# Patient Record
Sex: FEMALE | Race: WHITE | NOT HISPANIC OR LATINO
[De-identification: names, ages, dates, MRNs, and addresses within clinical notes are randomized per-mention and may not be internally consistent; named-entity substitution may affect disease eponyms.]

---

## 2017-01-13 ENCOUNTER — MEDICATION RENEWAL (OUTPATIENT)
Age: 67
End: 2017-01-13

## 2017-01-18 ENCOUNTER — RX RENEWAL (OUTPATIENT)
Age: 67
End: 2017-01-18

## 2017-02-27 ENCOUNTER — APPOINTMENT (OUTPATIENT)
Dept: INTERNAL MEDICINE | Facility: CLINIC | Age: 67
End: 2017-02-27

## 2017-02-27 ENCOUNTER — RX RENEWAL (OUTPATIENT)
Age: 67
End: 2017-02-27

## 2017-02-27 VITALS — DIASTOLIC BLOOD PRESSURE: 70 MMHG | HEART RATE: 72 BPM | RESPIRATION RATE: 14 BRPM | SYSTOLIC BLOOD PRESSURE: 138 MMHG

## 2017-03-02 LAB
ALBUMIN SERPL ELPH-MCNC: 4.6 G/DL
ALP BLD-CCNC: 56 U/L
ALT SERPL-CCNC: 19 U/L
ANION GAP SERPL CALC-SCNC: 15 MMOL/L
AST SERPL-CCNC: 23 U/L
BILIRUB SERPL-MCNC: 0.6 MG/DL
BUN SERPL-MCNC: 15 MG/DL
CALCIUM SERPL-MCNC: 9.7 MG/DL
CHLORIDE SERPL-SCNC: 102 MMOL/L
CHOLEST SERPL-MCNC: 271 MG/DL
CHOLEST/HDLC SERPL: 4 RATIO
CO2 SERPL-SCNC: 24 MMOL/L
CREAT SERPL-MCNC: 0.76 MG/DL
GLUCOSE SERPL-MCNC: 79 MG/DL
HDLC SERPL-MCNC: 67 MG/DL
LDLC SERPL CALC-MCNC: 139 MG/DL
POTASSIUM SERPL-SCNC: 4.2 MMOL/L
PROT SERPL-MCNC: 7.3 G/DL
SODIUM SERPL-SCNC: 141 MMOL/L
TRIGL SERPL-MCNC: 327 MG/DL

## 2017-03-13 ENCOUNTER — RX RENEWAL (OUTPATIENT)
Age: 67
End: 2017-03-13

## 2017-05-31 ENCOUNTER — APPOINTMENT (OUTPATIENT)
Dept: INTERNAL MEDICINE | Facility: CLINIC | Age: 67
End: 2017-05-31

## 2017-05-31 ENCOUNTER — NON-APPOINTMENT (OUTPATIENT)
Age: 67
End: 2017-05-31

## 2017-05-31 VITALS — WEIGHT: 136 LBS | HEIGHT: 63 IN | BODY MASS INDEX: 24.1 KG/M2

## 2017-05-31 VITALS — RESPIRATION RATE: 14 BRPM | SYSTOLIC BLOOD PRESSURE: 132 MMHG | HEART RATE: 72 BPM | DIASTOLIC BLOOD PRESSURE: 76 MMHG

## 2017-05-31 DIAGNOSIS — E55.9 VITAMIN D DEFICIENCY, UNSPECIFIED: ICD-10-CM

## 2017-05-31 RX ORDER — MELOXICAM 15 MG/1
15 TABLET ORAL
Qty: 30 | Refills: 0 | Status: COMPLETED | COMMUNITY
Start: 2017-04-19

## 2017-05-31 RX ORDER — UBIDECARENONE 100 MG
100 CAPSULE ORAL
Refills: 0 | Status: ACTIVE | COMMUNITY
Start: 2017-05-31

## 2017-05-31 RX ORDER — CEPHALEXIN 500 MG/1
500 CAPSULE ORAL
Qty: 20 | Refills: 0 | Status: COMPLETED | COMMUNITY
Start: 2017-03-06

## 2017-05-31 RX ORDER — METOCLOPRAMIDE 10 MG/1
10 TABLET ORAL
Qty: 20 | Refills: 0 | Status: COMPLETED | COMMUNITY
Start: 2017-03-23

## 2017-05-31 RX ORDER — TIZANIDINE HYDROCHLORIDE 2 MG/1
2 CAPSULE ORAL
Qty: 28 | Refills: 0 | Status: COMPLETED | COMMUNITY
Start: 2017-03-13

## 2017-05-31 RX ORDER — MELOXICAM 7.5 MG/1
7.5 TABLET ORAL
Qty: 60 | Refills: 0 | Status: COMPLETED | COMMUNITY
Start: 2017-03-22

## 2017-05-31 RX ORDER — TAPENTADOL HYDROCHLORIDE 50 MG/1
50 TABLET, FILM COATED ORAL
Qty: 120 | Refills: 0 | Status: COMPLETED | COMMUNITY
Start: 2017-03-10

## 2017-05-31 RX ORDER — PANTOPRAZOLE 40 MG/1
40 TABLET, DELAYED RELEASE ORAL
Qty: 42 | Refills: 0 | Status: COMPLETED | COMMUNITY
Start: 2017-03-10

## 2017-06-01 ENCOUNTER — RX RENEWAL (OUTPATIENT)
Age: 67
End: 2017-06-01

## 2017-06-23 LAB
25(OH)D3 SERPL-MCNC: 31.1 NG/ML
ALBUMIN SERPL ELPH-MCNC: 4.7 G/DL
ALP BLD-CCNC: 50 U/L
ALT SERPL-CCNC: 12 U/L
ANION GAP SERPL CALC-SCNC: 16 MMOL/L
APPEARANCE: CLEAR
AST SERPL-CCNC: 19 U/L
BASOPHILS # BLD AUTO: 0.03 K/UL
BASOPHILS NFR BLD AUTO: 0.4 %
BILIRUB SERPL-MCNC: 0.3 MG/DL
BILIRUBIN URINE: NEGATIVE
BLOOD URINE: NEGATIVE
BUN SERPL-MCNC: 29 MG/DL
CALCIUM SERPL-MCNC: 9.6 MG/DL
CHLORIDE SERPL-SCNC: 105 MMOL/L
CHOLEST SERPL-MCNC: 208 MG/DL
CHOLEST/HDLC SERPL: 3.5 RATIO
CO2 SERPL-SCNC: 21 MMOL/L
COLOR: YELLOW
CREAT SERPL-MCNC: 0.96 MG/DL
EOSINOPHIL # BLD AUTO: 0.09 K/UL
EOSINOPHIL NFR BLD AUTO: 1.3 %
FOLATE SERPL-MCNC: >20 NG/ML
GLUCOSE QUALITATIVE U: NORMAL MG/DL
GLUCOSE SERPL-MCNC: 86 MG/DL
HBA1C MFR BLD HPLC: 5.6 %
HCT VFR BLD CALC: 39.1 %
HDLC SERPL-MCNC: 60 MG/DL
HGB BLD-MCNC: 12.5 G/DL
IMM GRANULOCYTES NFR BLD AUTO: 0.3 %
KETONES URINE: NEGATIVE
LDLC SERPL CALC-MCNC: 104 MG/DL
LEUKOCYTE ESTERASE URINE: NEGATIVE
LYMPHOCYTES # BLD AUTO: 1.69 K/UL
LYMPHOCYTES NFR BLD AUTO: 25.1 %
MAGNESIUM SERPL-MCNC: 2.3 MG/DL
MAN DIFF?: NORMAL
MCHC RBC-ENTMCNC: 30.7 PG
MCHC RBC-ENTMCNC: 32 GM/DL
MCV RBC AUTO: 96.1 FL
MONOCYTES # BLD AUTO: 0.4 K/UL
MONOCYTES NFR BLD AUTO: 6 %
NEUTROPHILS # BLD AUTO: 4.49 K/UL
NEUTROPHILS NFR BLD AUTO: 66.9 %
NITRITE URINE: NEGATIVE
PH URINE: 7
PLATELET # BLD AUTO: 262 K/UL
POTASSIUM SERPL-SCNC: 4.5 MMOL/L
PROT SERPL-MCNC: 7.4 G/DL
PROTEIN URINE: NEGATIVE MG/DL
RBC # BLD: 4.07 M/UL
RBC # FLD: 13.9 %
SODIUM SERPL-SCNC: 142 MMOL/L
SPECIFIC GRAVITY URINE: 1.01
T4 FREE SERPL-MCNC: 1.3 NG/DL
T4 SERPL-MCNC: 6.3 UG/DL
TRIGL SERPL-MCNC: 219 MG/DL
TSH SERPL-ACNC: 1.82 UIU/ML
UROBILINOGEN URINE: NORMAL MG/DL
VIT B12 SERPL-MCNC: 810 PG/ML
WBC # FLD AUTO: 6.72 K/UL

## 2017-07-11 ENCOUNTER — APPOINTMENT (OUTPATIENT)
Dept: INFECTIOUS DISEASE | Facility: CLINIC | Age: 67
End: 2017-07-11

## 2017-07-11 DIAGNOSIS — Z71.89 OTHER SPECIFIED COUNSELING: ICD-10-CM

## 2017-07-14 ENCOUNTER — OTHER (OUTPATIENT)
Age: 67
End: 2017-07-14

## 2017-07-14 PROBLEM — Z71.89 TRAVEL ADVICE ENCOUNTER: Status: ACTIVE | Noted: 2017-07-14

## 2017-07-17 ENCOUNTER — APPOINTMENT (OUTPATIENT)
Dept: INTERNAL MEDICINE | Facility: CLINIC | Age: 67
End: 2017-07-17

## 2017-07-17 DIAGNOSIS — Z23 ENCOUNTER FOR IMMUNIZATION: ICD-10-CM

## 2017-07-18 ENCOUNTER — MED ADMIN CHARGE (OUTPATIENT)
Age: 67
End: 2017-07-18

## 2017-08-01 ENCOUNTER — APPOINTMENT (OUTPATIENT)
Dept: INTERNAL MEDICINE | Facility: CLINIC | Age: 67
End: 2017-08-01
Payer: MEDICARE

## 2017-08-01 PROCEDURE — 90471 IMMUNIZATION ADMIN: CPT

## 2017-08-01 PROCEDURE — 90715 TDAP VACCINE 7 YRS/> IM: CPT | Mod: GY

## 2017-08-30 ENCOUNTER — RX RENEWAL (OUTPATIENT)
Age: 67
End: 2017-08-30

## 2017-09-18 ENCOUNTER — APPOINTMENT (OUTPATIENT)
Dept: ORTHOPEDIC SURGERY | Facility: CLINIC | Age: 67
End: 2017-09-18
Payer: MEDICARE

## 2017-09-18 VITALS
HEIGHT: 63 IN | HEART RATE: 80 BPM | SYSTOLIC BLOOD PRESSURE: 160 MMHG | DIASTOLIC BLOOD PRESSURE: 78 MMHG | WEIGHT: 137 LBS | BODY MASS INDEX: 24.27 KG/M2

## 2017-09-18 DIAGNOSIS — Z78.9 OTHER SPECIFIED HEALTH STATUS: ICD-10-CM

## 2017-09-18 DIAGNOSIS — M51.26 OTHER INTERVERTEBRAL DISC DISPLACEMENT, LUMBAR REGION: ICD-10-CM

## 2017-09-18 DIAGNOSIS — Z80.9 FAMILY HISTORY OF MALIGNANT NEOPLASM, UNSPECIFIED: ICD-10-CM

## 2017-09-18 DIAGNOSIS — M43.16 SPONDYLOLISTHESIS, LUMBAR REGION: ICD-10-CM

## 2017-09-18 PROCEDURE — 73502 X-RAY EXAM HIP UNI 2-3 VIEWS: CPT | Mod: LT

## 2017-09-18 PROCEDURE — 99204 OFFICE O/P NEW MOD 45 MIN: CPT

## 2017-09-18 PROCEDURE — 72110 X-RAY EXAM L-2 SPINE 4/>VWS: CPT

## 2017-09-18 RX ORDER — FLUTICASONE PROPIONATE 50 UG/1
50 SPRAY, METERED NASAL
Refills: 0 | Status: ACTIVE | COMMUNITY

## 2017-09-18 RX ORDER — HYDROCODONE BITARTRATE AND ACETAMINOPHEN 10; 325 MG/1; MG/1
10-325 TABLET ORAL
Qty: 28 | Refills: 0 | Status: DISCONTINUED | COMMUNITY
Start: 2017-08-23

## 2017-09-18 RX ORDER — ACETAMINOPHEN 325 MG/1
TABLET, FILM COATED ORAL
Refills: 0 | Status: ACTIVE | COMMUNITY

## 2017-09-18 RX ORDER — MELOXICAM 15 MG/1
TABLET ORAL
Refills: 0 | Status: ACTIVE | COMMUNITY

## 2017-09-19 PROBLEM — M43.16 SPONDYLOLISTHESIS OF LUMBAR REGION: Status: ACTIVE | Noted: 2017-05-31

## 2017-09-25 ENCOUNTER — MEDICATION RENEWAL (OUTPATIENT)
Age: 67
End: 2017-09-25

## 2017-09-27 ENCOUNTER — MEDICATION RENEWAL (OUTPATIENT)
Age: 67
End: 2017-09-27

## 2017-10-05 ENCOUNTER — APPOINTMENT (OUTPATIENT)
Dept: INTERNAL MEDICINE | Facility: CLINIC | Age: 67
End: 2017-10-05
Payer: MEDICARE

## 2017-10-05 VITALS — SYSTOLIC BLOOD PRESSURE: 138 MMHG | DIASTOLIC BLOOD PRESSURE: 66 MMHG | HEART RATE: 72 BPM | RESPIRATION RATE: 14 BRPM

## 2017-10-05 DIAGNOSIS — R58 HEMORRHAGE, NOT ELSEWHERE CLASSIFIED: ICD-10-CM

## 2017-10-05 DIAGNOSIS — Z23 ENCOUNTER FOR IMMUNIZATION: ICD-10-CM

## 2017-10-05 DIAGNOSIS — I10 ESSENTIAL (PRIMARY) HYPERTENSION: ICD-10-CM

## 2017-10-05 PROCEDURE — 36415 COLL VENOUS BLD VENIPUNCTURE: CPT

## 2017-10-05 PROCEDURE — 99214 OFFICE O/P EST MOD 30 MIN: CPT | Mod: 25

## 2017-10-05 PROCEDURE — 90662 IIV NO PRSV INCREASED AG IM: CPT

## 2017-10-05 PROCEDURE — G0008: CPT

## 2017-10-05 RX ORDER — ROSUVASTATIN CALCIUM 5 MG/1
TABLET, FILM COATED ORAL
Refills: 0 | Status: DISCONTINUED | COMMUNITY
End: 2017-10-05

## 2017-10-05 RX ORDER — TRIAMTERENE AND HYDROCHLOROTHIAZIDE 37.5; 25 MG/1; MG/1
CAPSULE ORAL
Refills: 0 | Status: DISCONTINUED | COMMUNITY
End: 2017-10-05

## 2017-10-05 RX ORDER — LOSARTAN POTASSIUM 100 MG/1
TABLET, FILM COATED ORAL
Refills: 0 | Status: DISCONTINUED | COMMUNITY
End: 2017-10-05

## 2017-10-05 RX ORDER — AZITHROMYCIN 250 MG/1
250 TABLET, FILM COATED ORAL
Qty: 4 | Refills: 0 | Status: DISCONTINUED | COMMUNITY
Start: 2017-07-14 | End: 2017-10-05

## 2017-10-05 RX ORDER — HYDROCODONE BITARTRATE AND ACETAMINOPHEN 5; 325 MG/1; MG/1
5-325 TABLET ORAL
Qty: 84 | Refills: 0 | Status: DISCONTINUED | COMMUNITY
Start: 2017-04-05 | End: 2017-10-05

## 2017-10-05 RX ORDER — ATOVAQUONE AND PROGUANIL HYDROCHLORIDE 250; 100 MG/1; MG/1
250-100 TABLET, FILM COATED ORAL
Qty: 26 | Refills: 0 | Status: DISCONTINUED | COMMUNITY
Start: 2017-07-14 | End: 2017-10-05

## 2017-10-05 RX ORDER — LOSARTAN POTASSIUM AND HYDROCHLOROTHIAZIDE 12.5; 5 MG/1; MG/1
50-12.5 TABLET ORAL
Qty: 90 | Refills: 0 | Status: DISCONTINUED | COMMUNITY
Start: 2017-06-01 | End: 2017-10-05

## 2017-10-06 LAB
ALBUMIN SERPL ELPH-MCNC: 4.4 G/DL
ALP BLD-CCNC: 42 U/L
ALT SERPL-CCNC: 16 U/L
ANION GAP SERPL CALC-SCNC: 13 MMOL/L
AST SERPL-CCNC: 21 U/L
BASOPHILS # BLD AUTO: 0.03 K/UL
BASOPHILS NFR BLD AUTO: 0.4 %
BILIRUB SERPL-MCNC: 0.4 MG/DL
BUN SERPL-MCNC: 25 MG/DL
CALCIUM SERPL-MCNC: 9.2 MG/DL
CHLORIDE SERPL-SCNC: 104 MMOL/L
CHOLEST SERPL-MCNC: 224 MG/DL
CHOLEST/HDLC SERPL: 3 RATIO
CO2 SERPL-SCNC: 27 MMOL/L
CREAT SERPL-MCNC: 1.12 MG/DL
EOSINOPHIL # BLD AUTO: 0.07 K/UL
EOSINOPHIL NFR BLD AUTO: 1 %
GLUCOSE SERPL-MCNC: 111 MG/DL
HCT VFR BLD CALC: 36.5 %
HDLC SERPL-MCNC: 74 MG/DL
HGB BLD-MCNC: 11.7 G/DL
IMM GRANULOCYTES NFR BLD AUTO: 0.1 %
LDLC SERPL CALC-MCNC: 125 MG/DL
LYMPHOCYTES # BLD AUTO: 2.16 K/UL
LYMPHOCYTES NFR BLD AUTO: 30.7 %
MAN DIFF?: NORMAL
MCHC RBC-ENTMCNC: 32.1 GM/DL
MCHC RBC-ENTMCNC: 32.4 PG
MCV RBC AUTO: 101.1 FL
MONOCYTES # BLD AUTO: 0.61 K/UL
MONOCYTES NFR BLD AUTO: 8.7 %
NEUTROPHILS # BLD AUTO: 4.16 K/UL
NEUTROPHILS NFR BLD AUTO: 59.1 %
PLATELET # BLD AUTO: 299 K/UL
POTASSIUM SERPL-SCNC: 3.8 MMOL/L
PROT SERPL-MCNC: 7.1 G/DL
RBC # BLD: 3.61 M/UL
RBC # FLD: 14.6 %
SODIUM SERPL-SCNC: 144 MMOL/L
TRIGL SERPL-MCNC: 124 MG/DL
WBC # FLD AUTO: 7.04 K/UL

## 2017-10-31 ENCOUNTER — APPOINTMENT (OUTPATIENT)
Dept: ORTHOPEDIC SURGERY | Facility: CLINIC | Age: 67
End: 2017-10-31
Payer: MEDICARE

## 2017-10-31 VITALS
SYSTOLIC BLOOD PRESSURE: 191 MMHG | HEART RATE: 72 BPM | BODY MASS INDEX: 24.27 KG/M2 | DIASTOLIC BLOOD PRESSURE: 76 MMHG | HEIGHT: 63 IN | WEIGHT: 137 LBS

## 2017-10-31 DIAGNOSIS — M76.892 OTHER SPECIFIED ENTHESOPATHIES OF LEFT LOWER LIMB, EXCLUDING FOOT: ICD-10-CM

## 2017-10-31 PROCEDURE — 99214 OFFICE O/P EST MOD 30 MIN: CPT

## 2017-11-28 ENCOUNTER — FORM ENCOUNTER (OUTPATIENT)
Age: 67
End: 2017-11-28

## 2017-11-29 ENCOUNTER — APPOINTMENT (OUTPATIENT)
Dept: ULTRASOUND IMAGING | Facility: CLINIC | Age: 67
End: 2017-11-29

## 2017-11-29 ENCOUNTER — OUTPATIENT (OUTPATIENT)
Dept: OUTPATIENT SERVICES | Facility: HOSPITAL | Age: 67
LOS: 1 days | End: 2017-11-29
Payer: MEDICARE

## 2017-11-29 ENCOUNTER — APPOINTMENT (OUTPATIENT)
Dept: ULTRASOUND IMAGING | Facility: CLINIC | Age: 67
End: 2017-11-29
Payer: MEDICARE

## 2017-11-29 DIAGNOSIS — Z00.8 ENCOUNTER FOR OTHER GENERAL EXAMINATION: ICD-10-CM

## 2017-11-29 PROCEDURE — 20611 DRAIN/INJ JOINT/BURSA W/US: CPT | Mod: LT

## 2017-11-29 PROCEDURE — 20611 DRAIN/INJ JOINT/BURSA W/US: CPT

## 2017-12-15 ENCOUNTER — RX RENEWAL (OUTPATIENT)
Age: 67
End: 2017-12-15

## 2018-01-02 ENCOUNTER — APPOINTMENT (OUTPATIENT)
Dept: ORTHOPEDIC SURGERY | Facility: CLINIC | Age: 68
End: 2018-01-02
Payer: MEDICARE

## 2018-01-02 VITALS
HEART RATE: 73 BPM | BODY MASS INDEX: 23.92 KG/M2 | WEIGHT: 135 LBS | DIASTOLIC BLOOD PRESSURE: 80 MMHG | HEIGHT: 63 IN | SYSTOLIC BLOOD PRESSURE: 134 MMHG

## 2018-01-02 DIAGNOSIS — M25.552 PAIN IN LEFT HIP: ICD-10-CM

## 2018-01-02 DIAGNOSIS — M16.12 UNILATERAL PRIMARY OSTEOARTHRITIS, LEFT HIP: ICD-10-CM

## 2018-01-02 PROCEDURE — 99213 OFFICE O/P EST LOW 20 MIN: CPT

## 2018-01-27 ENCOUNTER — RX RENEWAL (OUTPATIENT)
Age: 68
End: 2018-01-27

## 2018-02-20 ENCOUNTER — RX RENEWAL (OUTPATIENT)
Age: 68
End: 2018-02-20

## 2018-03-07 ENCOUNTER — APPOINTMENT (OUTPATIENT)
Dept: INTERNAL MEDICINE | Facility: CLINIC | Age: 68
End: 2018-03-07
Payer: MEDICARE

## 2018-03-07 VITALS — RESPIRATION RATE: 14 BRPM | DIASTOLIC BLOOD PRESSURE: 80 MMHG | SYSTOLIC BLOOD PRESSURE: 170 MMHG | HEART RATE: 72 BPM

## 2018-03-07 DIAGNOSIS — J06.9 ACUTE UPPER RESPIRATORY INFECTION, UNSPECIFIED: ICD-10-CM

## 2018-03-07 PROCEDURE — 99213 OFFICE O/P EST LOW 20 MIN: CPT

## 2018-03-08 LAB
HPIV1 RNA SPEC QL NAA+PROBE: DETECTED
RAPID RVP RESULT: DETECTED

## 2018-04-03 ENCOUNTER — RX RENEWAL (OUTPATIENT)
Age: 68
End: 2018-04-03

## 2018-05-02 ENCOUNTER — RX RENEWAL (OUTPATIENT)
Age: 68
End: 2018-05-02

## 2018-07-18 ENCOUNTER — RX RENEWAL (OUTPATIENT)
Age: 68
End: 2018-07-18

## 2018-07-24 ENCOUNTER — RX RENEWAL (OUTPATIENT)
Age: 68
End: 2018-07-24

## 2018-08-24 ENCOUNTER — RX RENEWAL (OUTPATIENT)
Age: 68
End: 2018-08-24

## 2018-08-29 ENCOUNTER — RX RENEWAL (OUTPATIENT)
Age: 68
End: 2018-08-29

## 2018-09-05 ENCOUNTER — APPOINTMENT (OUTPATIENT)
Dept: INTERNAL MEDICINE | Facility: CLINIC | Age: 68
End: 2018-09-05
Payer: MEDICARE

## 2018-09-05 ENCOUNTER — NON-APPOINTMENT (OUTPATIENT)
Age: 68
End: 2018-09-05

## 2018-09-05 VITALS — WEIGHT: 132 LBS | BODY MASS INDEX: 23.39 KG/M2 | HEIGHT: 63 IN

## 2018-09-05 VITALS — SYSTOLIC BLOOD PRESSURE: 138 MMHG | HEART RATE: 72 BPM | RESPIRATION RATE: 14 BRPM | DIASTOLIC BLOOD PRESSURE: 80 MMHG

## 2018-09-05 DIAGNOSIS — E78.5 HYPERLIPIDEMIA, UNSPECIFIED: ICD-10-CM

## 2018-09-05 DIAGNOSIS — Z00.00 ENCOUNTER FOR GENERAL ADULT MEDICAL EXAMINATION W/OUT ABNORMAL FINDINGS: ICD-10-CM

## 2018-09-05 DIAGNOSIS — R09.82 POSTNASAL DRIP: ICD-10-CM

## 2018-09-05 DIAGNOSIS — I10 ESSENTIAL (PRIMARY) HYPERTENSION: ICD-10-CM

## 2018-09-05 DIAGNOSIS — Z87.448 PERSONAL HISTORY OF OTHER DISEASES OF URINARY SYSTEM: ICD-10-CM

## 2018-09-05 DIAGNOSIS — M19.90 UNSPECIFIED OSTEOARTHRITIS, UNSPECIFIED SITE: ICD-10-CM

## 2018-09-05 PROCEDURE — G0439: CPT

## 2018-09-05 PROCEDURE — 99214 OFFICE O/P EST MOD 30 MIN: CPT | Mod: 25

## 2018-09-05 PROCEDURE — 99497 ADVNCD CARE PLAN 30 MIN: CPT | Mod: 33

## 2018-09-05 PROCEDURE — 36415 COLL VENOUS BLD VENIPUNCTURE: CPT

## 2018-09-05 PROCEDURE — 93000 ELECTROCARDIOGRAM COMPLETE: CPT

## 2018-09-05 RX ORDER — AZITHROMYCIN 250 MG/1
250 TABLET, FILM COATED ORAL
Qty: 6 | Refills: 0 | Status: DISCONTINUED | COMMUNITY
Start: 2018-03-07 | End: 2018-09-05

## 2018-09-05 NOTE — PHYSICAL EXAM
[General Appearance - Alert] : alert [General Appearance - In No Acute Distress] : in no acute distress [Sclera] : the sclera and conjunctiva were normal [PERRL With Normal Accommodation] : pupils were equal in size, round, and reactive to light [Extraocular Movements] : extraocular movements were intact [Outer Ear] : the ears and nose were normal in appearance [Oropharynx] : the oropharynx was normal [Auscultation Breath Sounds / Voice Sounds] : lungs were clear to auscultation bilaterally [Heart Rate And Rhythm] : heart rate was normal and rhythm regular [Heart Sounds] : normal S1 and S2 [Heart Sounds Gallop] : no gallops [Murmurs] : no murmurs [Heart Sounds Pericardial Friction Rub] : no pericardial rub [Edema] : there was no peripheral edema [Bowel Sounds] : normal bowel sounds [Abdomen Soft] : soft [Abdomen Tenderness] : non-tender [Abdomen Mass (___ Cm)] : no abdominal mass palpated [Cervical Lymph Nodes Enlarged Posterior Bilaterally] : posterior cervical [Cervical Lymph Nodes Enlarged Anterior Bilaterally] : anterior cervical [Supraclavicular Lymph Nodes Enlarged Bilaterally] : supraclavicular [No CVA Tenderness] : no ~M costovertebral angle tenderness [No Spinal Tenderness] : no spinal tenderness [Abnormal Walk] : normal gait [Nail Clubbing] : no clubbing  or cyanosis of the fingernails [Musculoskeletal - Swelling] : no joint swelling seen [Motor Tone] : muscle strength and tone were normal [Skin Color & Pigmentation] : normal skin color and pigmentation [Skin Turgor] : normal skin turgor [] : no rash [Deep Tendon Reflexes (DTR)] : deep tendon reflexes were 2+ and symmetric [Sensation] : the sensory exam was normal to light touch and pinprick [No Focal Deficits] : no focal deficits [Oriented To Time, Place, And Person] : oriented to person, place, and time [Impaired Insight] : insight and judgment were intact [Affect] : the affect was normal [FreeTextEntry1] : deferred to gyn Dr Stack

## 2018-09-05 NOTE — HEALTH RISK ASSESSMENT
[Excellent] : ~his/her~  mood as  excellent [No falls in past year] : Patient reported no falls in the past year [0] : 2) Feeling down, depressed, or hopeless: Not at all (0) [None] : None [With Significant Other] : lives with significant other [Retired] : retired [] :  [Fully functional (bathing, dressing, toileting, transferring, walking, feeding)] : Fully functional (bathing, dressing, toileting, transferring, walking, feeding) [Fully functional (using the telephone, shopping, preparing meals, housekeeping, doing laundry, using] : Fully functional and needs no help or supervision to perform IADLs (using the telephone, shopping, preparing meals, housekeeping, doing laundry, using transportation, managing medications and managing finances) [] : No [[id number]] : 0 [HIV test declined] : HIV test declined [Hepatitis C test declined] : Hepatitis C test declined [Change in mental status noted] : No change in mental status noted [Reports changes in hearing] : Reports no changes in hearing [Reports changes in vision] : Reports no changes in vision [Reports changes in dental health] : Reports no changes in dental health [Smoke Detector] : no smoke detector

## 2018-09-05 NOTE — HISTORY OF PRESENT ILLNESS
[PMH Reviewed and Updated] : past medical history reviewed and updated [PSH Reviewed and Updated] : past surgical history reviewed and updated [Family History Reviewed and Updated] : family history reviewed and updated [Medication and Allergies Reconciled] : medication and allergies reconciled [2] : 2 [Retired] : retired from work [Never] : has never used illicit drugs [Low Fat Diet] : low fat [General Adherence] : and is generally adherent [Compliant with medications] : compliant with medications [Spouse] : spouse [Friends] : friends [Children] : children [Fully Independent] : fully independent [Drives without concerns] : drives without concerns [No history of falls] : no history of falls [Seatbelts] : seatbelts [Over the Past 2 Weeks, Have You Felt Down, Depressed, or Hopeless?] : 1.) Over the past 2 weeks, have you felt down, depressed, or hopeless? No [Over the Past 2 Weeks, Have You Felt Little Interest or Pleasure Doing Things?] : 2.) Over the past 2 weeks, have you felt little interest or pleasure doing things? No

## 2018-09-05 NOTE — ASSESSMENT
[FreeTextEntry1] : Blood work was drawn and sent to the lab today. The patient has been instructed to call the office next week to discuss today's lab work.\par \par Consider Shingrix\par \par Dermatology evaluation\par Debrox PRN\par \par Mammogram / Routine GYN evaluation\par \par 16 minutes spent discussing HCP/ACP - she will send me a copy of her proxy.\par \par F/U 3 months\par \par Annual CC

## 2018-09-19 ENCOUNTER — RX RENEWAL (OUTPATIENT)
Age: 68
End: 2018-09-19

## 2018-09-22 ENCOUNTER — RX RENEWAL (OUTPATIENT)
Age: 68
End: 2018-09-22

## 2018-09-28 ENCOUNTER — RX RENEWAL (OUTPATIENT)
Age: 68
End: 2018-09-28

## 2018-10-16 ENCOUNTER — APPOINTMENT (OUTPATIENT)
Dept: INTERNAL MEDICINE | Facility: CLINIC | Age: 68
End: 2018-10-16
Payer: MEDICARE

## 2018-10-16 ENCOUNTER — MED ADMIN CHARGE (OUTPATIENT)
Age: 68
End: 2018-10-16

## 2018-10-16 PROCEDURE — 90662 IIV NO PRSV INCREASED AG IM: CPT

## 2018-10-16 PROCEDURE — G0008: CPT

## 2018-10-26 ENCOUNTER — RX RENEWAL (OUTPATIENT)
Age: 68
End: 2018-10-26

## 2018-12-01 ENCOUNTER — RX RENEWAL (OUTPATIENT)
Age: 68
End: 2018-12-01

## 2019-02-21 ENCOUNTER — MEDICATION RENEWAL (OUTPATIENT)
Age: 69
End: 2019-02-21

## 2019-02-25 ENCOUNTER — RX RENEWAL (OUTPATIENT)
Age: 69
End: 2019-02-25

## 2019-02-27 ENCOUNTER — APPOINTMENT (OUTPATIENT)
Dept: INTERNAL MEDICINE | Facility: CLINIC | Age: 69
End: 2019-02-27
Payer: MEDICARE

## 2019-02-27 VITALS — RESPIRATION RATE: 14 BRPM | DIASTOLIC BLOOD PRESSURE: 80 MMHG | SYSTOLIC BLOOD PRESSURE: 140 MMHG | HEART RATE: 72 BPM

## 2019-02-27 DIAGNOSIS — Z87.09 PERSONAL HISTORY OF OTHER DISEASES OF THE RESPIRATORY SYSTEM: ICD-10-CM

## 2019-02-27 PROCEDURE — 99213 OFFICE O/P EST LOW 20 MIN: CPT | Mod: 25

## 2019-02-27 PROCEDURE — 87804 INFLUENZA ASSAY W/OPTIC: CPT | Mod: 59,QW

## 2019-02-27 RX ORDER — OSELTAMIVIR PHOSPHATE 75 MG/1
75 CAPSULE ORAL TWICE DAILY
Qty: 10 | Refills: 0 | Status: ACTIVE | COMMUNITY
Start: 2019-02-27 | End: 1900-01-01

## 2019-02-27 NOTE — PHYSICAL EXAM
[Ill-Appearing] : ill-appearing [Normal Sclera/Conjunctiva] : normal sclera/conjunctiva [Supple] : supple [No Lymphadenopathy] : no lymphadenopathy [Clear to Auscultation] : lungs were clear to auscultation bilaterally [No Accessory Muscle Use] : no accessory muscle use [Regular Rhythm] : with a regular rhythm [Normal S1, S2] : normal S1 and S2 [No Edema] : there was no peripheral edema [Soft] : abdomen soft [Non Tender] : non-tender [Non-distended] : non-distended [Normal Bowel Sounds] : normal bowel sounds

## 2019-02-27 NOTE — ASSESSMENT
[FreeTextEntry1] : Flu A positive\par \par Begin Tamiflu bid for five days\par Tylenol/Motrin alternating every 4 hours\par INcrease hydration\par \par F/U if symptoms do not resolve, or if they should change/worsen.\par

## 2019-03-02 ENCOUNTER — RX RENEWAL (OUTPATIENT)
Age: 69
End: 2019-03-02

## 2019-03-02 RX ORDER — TRIAMTERENE AND HYDROCHLOROTHIAZIDE 25; 37.5 MG/1; MG/1
37.5-25 TABLET ORAL
Qty: 90 | Refills: 1 | Status: ACTIVE | COMMUNITY
Start: 2018-09-22 | End: 1900-01-01

## 2019-03-12 ENCOUNTER — RX RENEWAL (OUTPATIENT)
Age: 69
End: 2019-03-12

## 2019-03-12 LAB
25(OH)D3 SERPL-MCNC: 47.5 NG/ML
ALBUMIN SERPL ELPH-MCNC: 4.9 G/DL
ALP BLD-CCNC: 50 U/L
ALT SERPL-CCNC: 15 U/L
ANION GAP SERPL CALC-SCNC: 16 MMOL/L
APPEARANCE: CLEAR
AST SERPL-CCNC: 25 U/L
BASOPHILS # BLD AUTO: 0.04 K/UL
BASOPHILS NFR BLD AUTO: 0.6 %
BILIRUB SERPL-MCNC: 0.6 MG/DL
BILIRUBIN URINE: NEGATIVE
BLOOD URINE: NEGATIVE
BUN SERPL-MCNC: 27 MG/DL
CALCIUM SERPL-MCNC: 9.5 MG/DL
CHLORIDE SERPL-SCNC: 103 MMOL/L
CHOLEST SERPL-MCNC: 214 MG/DL
CHOLEST/HDLC SERPL: 3.3 RATIO
CO2 SERPL-SCNC: 23 MMOL/L
COLOR: YELLOW
CREAT SERPL-MCNC: 1.07 MG/DL
EOSINOPHIL # BLD AUTO: 0.08 K/UL
EOSINOPHIL NFR BLD AUTO: 1.1 %
FOLATE SERPL-MCNC: >20 NG/ML
GLUCOSE QUALITATIVE U: NEGATIVE MG/DL
GLUCOSE SERPL-MCNC: 101 MG/DL
HBA1C MFR BLD HPLC: 5.4 %
HCT VFR BLD CALC: 42.5 %
HDLC SERPL-MCNC: 65 MG/DL
HGB BLD-MCNC: 13.7 G/DL
IMM GRANULOCYTES NFR BLD AUTO: 0.1 %
KETONES URINE: NEGATIVE
LDLC SERPL CALC-MCNC: 120 MG/DL
LEUKOCYTE ESTERASE URINE: NEGATIVE
LYMPHOCYTES # BLD AUTO: 1.69 K/UL
LYMPHOCYTES NFR BLD AUTO: 24 %
MAGNESIUM SERPL-MCNC: 2.2 MG/DL
MAN DIFF?: NORMAL
MCHC RBC-ENTMCNC: 32.2 GM/DL
MCHC RBC-ENTMCNC: 32.7 PG
MCV RBC AUTO: 101.4 FL
MONOCYTES # BLD AUTO: 0.66 K/UL
MONOCYTES NFR BLD AUTO: 9.4 %
NEUTROPHILS # BLD AUTO: 4.55 K/UL
NEUTROPHILS NFR BLD AUTO: 64.8 %
NITRITE URINE: NEGATIVE
PH URINE: 5.5
PLATELET # BLD AUTO: 274 K/UL
POTASSIUM SERPL-SCNC: 4.2 MMOL/L
PROT SERPL-MCNC: 7 G/DL
PROTEIN URINE: NEGATIVE MG/DL
RBC # BLD: 4.19 M/UL
RBC # FLD: 14 %
SODIUM SERPL-SCNC: 142 MMOL/L
SPECIFIC GRAVITY URINE: 1.02
T4 FREE SERPL-MCNC: 1.4 NG/DL
T4 SERPL-MCNC: 7.8 UG/DL
TRIGL SERPL-MCNC: 143 MG/DL
TSH SERPL-ACNC: 3.29 UIU/ML
UROBILINOGEN URINE: NEGATIVE MG/DL
VIT B12 SERPL-MCNC: 742 PG/ML
WBC # FLD AUTO: 7.03 K/UL

## 2019-05-15 ENCOUNTER — RX RENEWAL (OUTPATIENT)
Age: 69
End: 2019-05-15

## 2019-05-28 ENCOUNTER — RX RENEWAL (OUTPATIENT)
Age: 69
End: 2019-05-28

## 2019-08-23 ENCOUNTER — RX RENEWAL (OUTPATIENT)
Age: 69
End: 2019-08-23

## 2020-12-21 PROBLEM — Z87.09 HISTORY OF INFLUENZA: Status: RESOLVED | Noted: 2019-02-27 | Resolved: 2020-12-21

## 2023-12-31 PROBLEM — Z23 NEED FOR VACCINATION WITH 13-POLYVALENT PNEUMOCOCCAL CONJUGATE VACCINE: Status: ACTIVE | Noted: 2017-07-17
